# Patient Record
Sex: MALE | Race: ASIAN | NOT HISPANIC OR LATINO | URBAN - METROPOLITAN AREA
[De-identification: names, ages, dates, MRNs, and addresses within clinical notes are randomized per-mention and may not be internally consistent; named-entity substitution may affect disease eponyms.]

---

## 2024-02-28 ENCOUNTER — EMERGENCY (EMERGENCY)
Facility: HOSPITAL | Age: 44
LOS: 1 days | Discharge: HOME | End: 2024-02-28
Attending: EMERGENCY MEDICINE | Admitting: EMERGENCY MEDICINE
Payer: COMMERCIAL

## 2024-02-28 VITALS
RESPIRATION RATE: 18 BRPM | OXYGEN SATURATION: 99 % | TEMPERATURE: 98 F | HEART RATE: 85 BPM | SYSTOLIC BLOOD PRESSURE: 210 MMHG | DIASTOLIC BLOOD PRESSURE: 114 MMHG

## 2024-02-28 VITALS
DIASTOLIC BLOOD PRESSURE: 109 MMHG | HEART RATE: 90 BPM | OXYGEN SATURATION: 96 % | SYSTOLIC BLOOD PRESSURE: 149 MMHG | TEMPERATURE: 98 F | RESPIRATION RATE: 17 BRPM

## 2024-02-28 LAB
ALBUMIN SERPL ELPH-MCNC: 4.3 G/DL — SIGNIFICANT CHANGE UP (ref 3.3–5)
ALP SERPL-CCNC: 67 U/L — SIGNIFICANT CHANGE UP (ref 40–120)
ALT FLD-CCNC: 42 U/L — HIGH (ref 4–41)
ANION GAP SERPL CALC-SCNC: 13 MMOL/L — SIGNIFICANT CHANGE UP (ref 7–14)
AST SERPL-CCNC: 26 U/L — SIGNIFICANT CHANGE UP (ref 4–40)
BASOPHILS # BLD AUTO: 0.06 K/UL — SIGNIFICANT CHANGE UP (ref 0–0.2)
BASOPHILS NFR BLD AUTO: 0.6 % — SIGNIFICANT CHANGE UP (ref 0–2)
BILIRUB SERPL-MCNC: 0.4 MG/DL — SIGNIFICANT CHANGE UP (ref 0.2–1.2)
BUN SERPL-MCNC: 18 MG/DL — SIGNIFICANT CHANGE UP (ref 7–23)
CALCIUM SERPL-MCNC: 9.4 MG/DL — SIGNIFICANT CHANGE UP (ref 8.4–10.5)
CHLORIDE SERPL-SCNC: 101 MMOL/L — SIGNIFICANT CHANGE UP (ref 98–107)
CO2 SERPL-SCNC: 27 MMOL/L — SIGNIFICANT CHANGE UP (ref 22–31)
CREAT SERPL-MCNC: 0.94 MG/DL — SIGNIFICANT CHANGE UP (ref 0.5–1.3)
EGFR: 104 ML/MIN/1.73M2 — SIGNIFICANT CHANGE UP
EOSINOPHIL # BLD AUTO: 0.55 K/UL — HIGH (ref 0–0.5)
EOSINOPHIL NFR BLD AUTO: 5.1 % — SIGNIFICANT CHANGE UP (ref 0–6)
GLUCOSE SERPL-MCNC: 111 MG/DL — HIGH (ref 70–99)
HCT VFR BLD CALC: 47.5 % — SIGNIFICANT CHANGE UP (ref 39–50)
HGB BLD-MCNC: 15.8 G/DL — SIGNIFICANT CHANGE UP (ref 13–17)
IANC: 7.02 K/UL — SIGNIFICANT CHANGE UP (ref 1.8–7.4)
IMM GRANULOCYTES NFR BLD AUTO: 0.4 % — SIGNIFICANT CHANGE UP (ref 0–0.9)
LYMPHOCYTES # BLD AUTO: 2.58 K/UL — SIGNIFICANT CHANGE UP (ref 1–3.3)
LYMPHOCYTES # BLD AUTO: 23.9 % — SIGNIFICANT CHANGE UP (ref 13–44)
MAGNESIUM SERPL-MCNC: 2.3 MG/DL — SIGNIFICANT CHANGE UP (ref 1.6–2.6)
MCHC RBC-ENTMCNC: 28.8 PG — SIGNIFICANT CHANGE UP (ref 27–34)
MCHC RBC-ENTMCNC: 33.3 GM/DL — SIGNIFICANT CHANGE UP (ref 32–36)
MCV RBC AUTO: 86.5 FL — SIGNIFICANT CHANGE UP (ref 80–100)
MONOCYTES # BLD AUTO: 0.56 K/UL — SIGNIFICANT CHANGE UP (ref 0–0.9)
MONOCYTES NFR BLD AUTO: 5.2 % — SIGNIFICANT CHANGE UP (ref 2–14)
NEUTROPHILS # BLD AUTO: 7.02 K/UL — SIGNIFICANT CHANGE UP (ref 1.8–7.4)
NEUTROPHILS NFR BLD AUTO: 64.8 % — SIGNIFICANT CHANGE UP (ref 43–77)
NRBC # BLD: 0 /100 WBCS — SIGNIFICANT CHANGE UP (ref 0–0)
NRBC # FLD: 0 K/UL — SIGNIFICANT CHANGE UP (ref 0–0)
PLATELET # BLD AUTO: 370 K/UL — SIGNIFICANT CHANGE UP (ref 150–400)
POTASSIUM SERPL-MCNC: 3.8 MMOL/L — SIGNIFICANT CHANGE UP (ref 3.5–5.3)
POTASSIUM SERPL-SCNC: 3.8 MMOL/L — SIGNIFICANT CHANGE UP (ref 3.5–5.3)
PROT SERPL-MCNC: 7.4 G/DL — SIGNIFICANT CHANGE UP (ref 6–8.3)
RBC # BLD: 5.49 M/UL — SIGNIFICANT CHANGE UP (ref 4.2–5.8)
RBC # FLD: 13.1 % — SIGNIFICANT CHANGE UP (ref 10.3–14.5)
SODIUM SERPL-SCNC: 141 MMOL/L — SIGNIFICANT CHANGE UP (ref 135–145)
TROPONIN T, HIGH SENSITIVITY RESULT: 7 NG/L — SIGNIFICANT CHANGE UP
WBC # BLD: 10.81 K/UL — HIGH (ref 3.8–10.5)
WBC # FLD AUTO: 10.81 K/UL — HIGH (ref 3.8–10.5)

## 2024-02-28 PROCEDURE — 93010 ELECTROCARDIOGRAM REPORT: CPT

## 2024-02-28 PROCEDURE — 99285 EMERGENCY DEPT VISIT HI MDM: CPT

## 2024-02-28 NOTE — ED PROVIDER NOTE - CLINICAL SUMMARY MEDICAL DECISION MAKING FREE TEXT BOX
44 y/o male with PMH HTN and SAMANTHA presents with 1 month of waking up from sleep with numbness in his fingertips and 2 separate episodes of chest pain, 1 this morning for 30 minutes which has since subsided. Hypertensive to 210/114 in triage. Appears well. Non focal neuro exam, lungs clear, heart normal rate and rhythm.   Plan: Troponin to r/o ACS. Chest Xray to r/o lung pathology. Basic labs and reassess.
Fall risk

## 2024-02-28 NOTE — ED ADULT NURSE NOTE - OBJECTIVE STATEMENT
BREAK RN: 43 year old male, received to spot 8A. A&Ox4, ambulatory. Respirations equal and unlabored. Past medical history of HTN and pt smoker Pt c/o intermittent chest pain this morning that has since resided. Pt denies SOB, palpitations, dizziness, blurry vision, headache, numbness, tingling, fevers, chills, urinary symptoms, any other complaints. Neuro intact. PERRLA. Skin dry and intact. Abdomen soft, nontender, nondistended. Pt placed on cardiac monitor, normal sinus. No pallor or diaphoresis noted. 20G IV placed to R antecubital space. Labs obtained. Pending results. No acute distress noted. Safety maintained, bed in lowest position, side rails raised.

## 2024-02-28 NOTE — ED PROVIDER NOTE - NSFOLLOWUPINSTRUCTIONS_ED_ALL_ED_FT
Today you were seen in the ER for numbness and chest pain.     Please see below for a copy of your results.     Chest Pain    Chest pain can be caused by many different conditions which may or may not be dangerous. Causes include heartburn, lung infections, heart attack, blood clot in lungs, skin infections, strain or damage to muscle, cartilage, or bones, etc. In addition to a history and physical examination, an electrocardiogram (ECG) or other lab tests may have been performed to determine the cause of your chest pain. Follow up with your primary care provider or with a cardiologist as instructed.     SEEK IMMEDIATE MEDICAL CARE IF YOU HAVE ANY OF THE FOLLOWING SYMPTOMS: worsening chest pain, coughing up blood, unexplained back/neck/jaw pain, severe abdominal pain, dizziness or lightheadedness, fainting, shortness of breath, sweaty or clammy skin, vomiting, or racing heart beat. These symptoms may represent a serious problem that is an emergency. Do not wait to see if the symptoms will go away. Get medical help right away. Call 911 and do not drive yourself to the hospital.    Advance activity as tolerated.      Continue all previously prescribed medications as directed unless otherwise instructed.      Follow up with your primary care physician in 48-72 hours- bring copies of your results.

## 2024-02-28 NOTE — ED PROVIDER NOTE - PATIENT PORTAL LINK FT
You can access the FollowMyHealth Patient Portal offered by [facility] by registering at the following website: [email or web address]. By joining [employer]’s FollowMyHealth portal, you will also be able to view your health information using other applications (apps) compatible with our system.

## 2024-02-28 NOTE — ED ADULT TRIAGE NOTE - CHIEF COMPLAINT QUOTE
pt with hx HTN, c/o of chest discomfort and numbness to fingers for 2 weeks, no neuro deficits noted

## 2024-02-28 NOTE — ED PROVIDER NOTE - PROGRESS NOTE DETAILS
RANDY Bass: all results reviewed with patient, pending CXR, patient would not like to wait for CXR as he has to get his kids, reports no pain at this time, understands strict return precautions, will dc with cardiology and primary care doctor follow up.

## 2024-02-28 NOTE — ED PROVIDER NOTE - OBJECTIVE STATEMENT
42 y/o male with PMH HTN and SAMANTHA presents with 1 month of waking up from sleep with numbness in his fingertips and 2 separate episodes of chest pain. The numbness was first only in the fingertips of the right hand but now he feels in the fingertips of the left hand and reports trouble sleeping due to numbness due to increasing of frequency of the numbness. Patient reports 1 episode of chest pain 2 weeks ago that lasted for 15 minutes then subsided and a second episode of chest pain today that lasted for 30 minutes then subsided. Patient is currently not feeling any symptoms. Using CPAP machine to sleep with compliance but reports the machine falls off when he sleeps. Admits to intermittent headaches. Denies fever, chills, blurry vision, dizziness, loss of consciousness, palpitations, shortness of breath, nausea, vomiting, diarrhea, abdominal pain, dysuria, hematuria, hematochezia, or swelling in lower extremities.

## 2024-02-28 NOTE — ED PROVIDER NOTE - NSICDXPASTMEDICALHX_GEN_ALL_CORE_FT
PAST MEDICAL HISTORY:  HTN (hypertension)      PAST MEDICAL HISTORY:  HTN (hypertension)     Obstructive sleep apnea

## 2024-02-28 NOTE — ED PROVIDER NOTE - ATTENDING APP SHARED VISIT CONTRIBUTION OF CARE
43M h/o HTN c/o bilat fingers numbness x 1 month, wakes him up from sleep.  2 wk ago had episode of CP, today had similar episode, no longer having CP.  Intermittent HA.  BP high here.  Pt takes ?olmesartan.  Pt has CPAP, uses it but sometimes slips off .  Exam benign.  EKG nonischemic.  Hand numbness is intermittent, not happening at present, alternating sides, normal exam here, no neck pain or ttp.  Seems consistent with mild neuropraxia related to awkward sleeping position, pt advised to get a better pillow to keep his head and neck in neutral position while sleeping.  Former smoker; well appearing, in no distress here, good follow up potential.  Pt needing to leave to take care of children, will refer to cards as outpt for ischemic eval.  Emphasized importance of adherence to CPAP regimen.    VS:  unremarkable except HTN, improving    GEN - NAD;   well appearing;   A+O x3   HEAD - NC/AT     ENT - PEERL, EOMI, mucous membranes    moist , no discharge      NECK: Neck supple, non-tender without lymphadenopathy, no masses, no JVD  PULM - CTA b/l,  symmetric breath sounds  COR -  normal heart sounds    ABD - , ND, NT, soft,  BACK - no CVA tenderness, nontender spine     EXTREMS - no edema, no deformity, warm and well perfused    SKIN - no rash    or bruising      NEUROLOGIC - alert, face symmetric, speech fluent, sensation nl, motor no focal deficit.